# Patient Record
Sex: FEMALE | Race: WHITE | URBAN - METROPOLITAN AREA
[De-identification: names, ages, dates, MRNs, and addresses within clinical notes are randomized per-mention and may not be internally consistent; named-entity substitution may affect disease eponyms.]

---

## 2017-10-12 ENCOUNTER — IMPORTED ENCOUNTER (OUTPATIENT)
Dept: URBAN - METROPOLITAN AREA CLINIC 9 | Facility: CLINIC | Age: 82
End: 2017-10-12

## 2017-12-19 ENCOUNTER — IMPORTED ENCOUNTER (OUTPATIENT)
Dept: URBAN - METROPOLITAN AREA CLINIC 9 | Facility: CLINIC | Age: 82
End: 2017-12-19

## 2018-01-09 ENCOUNTER — IMPORTED ENCOUNTER (OUTPATIENT)
Dept: URBAN - METROPOLITAN AREA CLINIC 9 | Facility: CLINIC | Age: 83
End: 2018-01-09

## 2018-10-26 ENCOUNTER — IMPORTED ENCOUNTER (OUTPATIENT)
Dept: URBAN - METROPOLITAN AREA CLINIC 9 | Facility: CLINIC | Age: 83
End: 2018-10-26

## 2021-06-22 NOTE — PATIENT DISCUSSION
6/22/21: No NEW retinal tears or retinal detachment seen on clinical exam today. Reviewed the signs and symptoms of retinal tear/retinal detachment and the importance of calling for prompt evaluation should there be increasing floaters, new flashing lights, or decreasing peripheral vision in either eye at any time. Observation recommended.

## 2021-08-30 ENCOUNTER — IMPORTED ENCOUNTER (OUTPATIENT)
Dept: URBAN - METROPOLITAN AREA CLINIC 9 | Facility: CLINIC | Age: 86
End: 2021-08-30

## 2021-10-18 ASSESSMENT — TONOMETRY
OD_IOP_MMHG: 16
OD_IOP_MMHG: 14
OD_IOP_MMHG: 15
OD_IOP_MMHG: 17
OD_IOP_MMHG: 16
OS_IOP_MMHG: 15
OS_IOP_MMHG: 16
OS_IOP_MMHG: 14

## 2021-10-18 ASSESSMENT — VISUAL ACUITY
OD_CC: 20/25 SN
OD_CC: 20/25 -2 SN
OD_CC: 20/30 + SN
OD_CC: 20/30 + SN
OD_CC: 20/40 + SN
OS_CC: 20/200 SN
OD_CC: 20/30 + SN
OD_CC: 20/25 SN
OD_CC: 20/25 - SN
OS_CC: 20/200 SN
OS_CC: 20/200 SN

## 2021-10-18 ASSESSMENT — KERATOMETRY
OS_K2POWER_DIOPTERS: 44
OS_K2POWER_DIOPTERS: 44.25
OD_AXISANGLE_DEGREES: 71
OS_AXISANGLE2_DEGREES: 107
OD_AXISANGLE2_DEGREES: 55
OS_K1POWER_DIOPTERS: 40.75
OD_K1POWER_DIOPTERS: 43.25
OS_AXISANGLE2_DEGREES: 105
OD_AXISANGLE2_DEGREES: 161
OS_K2POWER_DIOPTERS: 44.5
OD_K1POWER_DIOPTERS: 43.25
OS_AXISANGLE_DEGREES: 17
OD_K2POWER_DIOPTERS: 43
OS_AXISANGLE2_DEGREES: 120
OS_AXISANGLE_DEGREES: 30
OS_K1POWER_DIOPTERS: 40.5
OD_K2POWER_DIOPTERS: 43.5
OD_AXISANGLE2_DEGREES: 150
OS_AXISANGLE_DEGREES: 15
OD_K2POWER_DIOPTERS: 44
OD_K1POWER_DIOPTERS: 42.75
OD_AXISANGLE_DEGREES: 145
OD_AXISANGLE_DEGREES: 60
OS_K1POWER_DIOPTERS: 40.75

## 2024-01-18 ENCOUNTER — ESTABLISHED PATIENT (OUTPATIENT)
Facility: LOCATION | Age: 89
End: 2024-01-18

## 2024-01-18 DIAGNOSIS — H04.123: ICD-10-CM

## 2024-01-18 DIAGNOSIS — H02.052: ICD-10-CM

## 2024-01-18 DIAGNOSIS — H11.153: ICD-10-CM

## 2024-01-18 DIAGNOSIS — H26.493: ICD-10-CM

## 2024-01-18 PROCEDURE — 92014 COMPRE OPH EXAM EST PT 1/>: CPT

## 2024-01-18 PROCEDURE — 92015 DETERMINE REFRACTIVE STATE: CPT

## 2024-01-18 PROCEDURE — 67820 REVISE EYELASHES: CPT

## 2024-01-18 RX ORDER — ERYTHROMYCIN 5 MG/G: OINTMENT OPHTHALMIC EVERY EVENING

## 2024-01-18 ASSESSMENT — KERATOMETRY
OD_K1POWER_DIOPTERS: 43.25
OD_AXISANGLE_DEGREES: 60
OD_AXISANGLE2_DEGREES: 150
OS_AXISANGLE_DEGREES: 15
OS_AXISANGLE2_DEGREES: 105
OS_K2POWER_DIOPTERS: 44.5
OS_K1POWER_DIOPTERS: 40.75
OD_K2POWER_DIOPTERS: 44

## 2024-01-18 ASSESSMENT — VISUAL ACUITY
OS_CC: 20/200
OD_GLARE: 20/400
OU_CC: 20/40-1
OD_CC: 20/40

## 2024-01-18 ASSESSMENT — TONOMETRY
OS_IOP_MMHG: 10
OD_IOP_MMHG: 10